# Patient Record
Sex: FEMALE | Race: WHITE | NOT HISPANIC OR LATINO | Employment: FULL TIME | URBAN - METROPOLITAN AREA
[De-identification: names, ages, dates, MRNs, and addresses within clinical notes are randomized per-mention and may not be internally consistent; named-entity substitution may affect disease eponyms.]

---

## 2024-02-12 ENCOUNTER — TELEMEDICINE (OUTPATIENT)
Dept: PRIMARY CARE | Facility: CLINIC | Age: 39
End: 2024-02-12
Payer: COMMERCIAL

## 2024-02-12 DIAGNOSIS — H66.92: ICD-10-CM

## 2024-02-12 DIAGNOSIS — H92.02 OTALGIA OF LEFT EAR: ICD-10-CM

## 2024-02-12 DIAGNOSIS — H66.002 NON-RECURRENT ACUTE SUPPURATIVE OTITIS MEDIA OF LEFT EAR WITHOUT SPONTANEOUS RUPTURE OF TYMPANIC MEMBRANE: Primary | ICD-10-CM

## 2024-02-12 PROCEDURE — 99442 PR PHYS/QHP TELEPHONE EVALUATION 11-20 MIN: CPT | Performed by: NURSE PRACTITIONER

## 2024-02-12 RX ORDER — AMOXICILLIN AND CLAVULANATE POTASSIUM 875; 125 MG/1; MG/1
875 TABLET, FILM COATED ORAL 2 TIMES DAILY
Qty: 20 TABLET | Refills: 0 | Status: SHIPPED | OUTPATIENT
Start: 2024-02-12 | End: 2024-02-22

## 2024-02-12 NOTE — PROGRESS NOTES
Subjective   Patient ID: Fiordaliza Julio is a 38 y.o. female who is with chief complaint of left ear pain.    HPI  Patient is a 38 y.o. female who CONSULTED AT [facility] - PHONE ONLY today. Patient is with complaints of left ear pain preceded by nasal congestion, nasal discharge, throat irritation, and  cough. Patient states that present condition started about 1 week ago as nasal congestion, watery nasal discharge, throat irritation and cough which was accompanied 1 day later by pain on left ear. She states these symptoms were not accompanied by fever, chills, nor any ear discharge. She denies shortness of breath, chest pain, palpitations, nor edema. She stated that she tried OTC cough and cold medications which afforded relief of symptoms of runny nose, cough, and sore throat but the ear pain is still there. She denies nausea, vomiting, abdominal pain, nor any other symptoms.     Review of Systems  General: no weight loss, generally healthy, no fatigue  Head:  no headaches / sinus pain, no vertigo, no injury  Eyes: no diplopia, no tearing, no pain,   Ears: (+) left ear pain, no tinnitus, no bleeding, no vertigo  Mouth:  no dental difficulties, no gingival bleeding, (+) throat irritation, no loss of sense of taste  Nose: (+) hx congestion, (+) hx discharge, no bleeding, no obstruction, no loss of sense of smell  Neck: no stiffness, no pain, no tenderness, no masses, no bruit  Pulmonary: no dyspnea, no wheezing, no hemoptysis, (+) hx cough  Cardiovascular: no chest pain, no palpitations, no syncope, no orthopnea  Gastrointestinal: no change in appetite, no dysphagia, no abdominal pains, no diarrhea, no emesis, no melena  Genito Urinary: no dysuria, no urinary urgency, no nocturia, no incontinence, no change in nature of urine  Musculoskeletal: no muscle ache, no joint pain, no limitation of range of motion, no paresthesia, no numbness  Constitutional: no fever, no chills, no night  sweats    Objective   NO VITAL SIGNS TAKEN FOR THIS PATIENT (VIRTUAL VISIT CONSULT - PHONE ONLY)    Physical Exam  PHYSICAL EXAMINATION WAS NOT DONE FOR THIS PATIENT (VIRTUAL VISIT CONSULT - PHONE ONLY)    Assessment/Plan   Problem List Items Addressed This Visit    None  Visit Diagnoses         Codes    Non-recurrent acute suppurative otitis media of left ear without spontaneous rupture of tympanic membrane    -  Primary H66.002    Relevant Medications    amoxicillin-pot clavulanate (Augmentin) 875-125 mg tablet    Otalgia of left ear     H92.02    Relevant Medications    amoxicillin-pot clavulanate (Augmentin) 875-125 mg tablet    Infection of left ear     H66.92    Relevant Medications    amoxicillin-pot clavulanate (Augmentin) 875-125 mg tablet        DISCHARGE SUMMARY:   Diagnosis, treatment, treatment options, and possible complications of today's illness discussed and explained to patient. Patient to take medication/s associated with this visit. Patient may also take OTC analgesic/antipyretic if needed for pain/fever. Advised to avoid ear manipulation / cleaning. Advised to avoid submerging on water. Advised to increase oral fluid intake. Advised to come back if with worsening or persistent symptoms. Patient verbalized understanding of plan of care.    Patient to come back in 7 - 10 days if needed for worsening symptoms.           TRIP Olsen-CNP 02/12/24 10:10 AM

## 2024-02-13 NOTE — PATIENT INSTRUCTIONS
DISCHARGE SUMMARY:   Diagnosis, treatment, treatment options, and possible complications of today's illness discussed and explained to patient. Patient to take medication/s associated with this visit. Patient may also take OTC analgesic/antipyretic if needed for pain/fever. Advised to avoid ear manipulation / cleaning. Advised to avoid submerging on water. Advised to increase oral fluid intake. Advised to come back if with worsening or persistent symptoms. Patient verbalized understanding of plan of care.    Patient to come back in 7 - 10 days if needed for worsening symptoms.

## 2024-02-19 ENCOUNTER — DOCUMENTATION (OUTPATIENT)
Dept: PRIMARY CARE | Facility: CLINIC | Age: 39
End: 2024-02-19
Payer: COMMERCIAL

## 2024-02-19 DIAGNOSIS — H66.002 NON-RECURRENT ACUTE SUPPURATIVE OTITIS MEDIA OF LEFT EAR WITHOUT SPONTANEOUS RUPTURE OF TYMPANIC MEMBRANE: Primary | ICD-10-CM

## 2024-02-19 DIAGNOSIS — H92.02 OTALGIA OF LEFT EAR: ICD-10-CM

## 2024-02-19 DIAGNOSIS — H66.92: ICD-10-CM

## 2024-02-19 RX ORDER — CIPROFLOXACIN 500 MG/1
500 TABLET ORAL 2 TIMES DAILY
Qty: 20 TABLET | Refills: 0 | Status: SHIPPED | OUTPATIENT
Start: 2024-02-19 | End: 2024-02-29

## 2024-02-20 NOTE — PROGRESS NOTES
Patient ID: Fiordaliza Julio is a 38 y.o. female who presents for the evaluation of pain and hearing loss in the left ear.    PROVIDER IMPRESSIONS:  DIAGNOSES/PROBLEMS:  -Conductive hearing loss in left ear with unrestricted hearing in right ear  -Left cerumen impaction (cast)    ASSESSMENT:   Fiordaliza Julio is a pleasant 38 y.o. female who presents with symptoms of left hearing loss and left otalgia. Based on the clinical information provided, symptoms and clinical exam findings are consistent with left cerumen impaction with cerumen cast deep in the left EAC. Unfortunately, due to degree of adherence and hard/firm texture of cerumen, attempt to remove left cerumen impaction today was unsuccessful and was unable to visualize left TM. I explained to patient that optimally safe removal of cerumen cast will include use of ceruminolytic to soften cerumen over the course of the week. Reassurance provided to patient that otologic exam today revealed no evidence of acute infection/inflammation in the external auditory canal (EAC) bilaterally and that right tympanic membrane (TM) appears with no evidence of infection, effusion, retraction or perforation bilaterally.  Audiogram reviewed in detail with the patient, which revealed mild conductive hearing loss in the left ear with type B tympanogram likely due to completely occlusive left cerumen cast.     PLAN:  I recommended use of o.t.c. ceruminolytic drops in the left ear every evening until follow-up to soften cerumen casted over the left TM. Patient verbalized understanding.   I recommended ongoing use of Flonase nasal spray with 1 puff in each nostril twice daily and p.o. ciprofloxacin managed by other clinicians.   Follow-up: Patient may schedule for follow-up in 1 week for second attempt at removal of cerumen cast in the left EAC. They may follow-up sooner, if needed. May consider repeat audiogram evaluation at follow-up. Patient is agreeable to this plan, all questions  were answered to patient's satisfaction.     Subjective   HPI: Fiordaliza Julio is a 38 y.o. female who presents for the evaluation of hearing loss and pain in the left ear.  The patient states that symptom onset began approximately 2-3 weeks ago. States she tested positive for influenza virus on 2/9/24, few days later noticed left ear pain and clogged/plugged sensation. Describes ear pain as a pressure and dull aching sensation rates as a 2/10 but while sneezing or coughing can be a brief sharp/shooting pain that is severe. Describes hearing loss as a muffled sensation. Patient virtually seen by PCP on 2/12/24 for 1 week symptom duration of left otalgia preceded by symptoms of nasal congestion, rhinorrhea, throat irritation, and cough and treated with p.o. Augmentin x 10 days for suspected left ear infection. Seen again by PCP on 2/19/24 for ongoing left-sided otalgia and plugged ear sensation and treated with p.o. ciprofloxacin BID x 10 days for left AOM, which she is still taking. Received an IM shot of rocephin this morning. When asked about the presence of hearing loss, ear pain, ear fullness/pressure, tinnitus, ear itching, ear drainage, autophony, dizziness or vertigo, she admits to mild occasional dizziness/dysequillbrium and left tinnitus. When asked about pertinent otologic history, the patient denies a history of recurrent ear infections, denies history of ear surgery, denies history of PE tube insertion, and denies history of prolonged/traumatic loud noise exposure. The patient does not insert Q-tips in the ear canals, and  denies insertion of other foreign objects into the ear canals. The patient denies history of wearing hearing aid devices. The patient does not endorse a family history of hearing loss.     PATIENT HISTORY:  Past Medical History:   Diagnosis Date    Personal history of other diseases of the respiratory system     History of asthma      Past Surgical History:   Procedure Laterality Date     OTHER SURGICAL HISTORY  02/13/2020    [place] tooth extraction    OTHER SURGICAL HISTORY  02/13/2020    Tonsillectomy with adenoidectomy      No Known Allergies     Current Outpatient Medications:     amoxicillin-pot clavulanate (Augmentin) 875-125 mg tablet, Take 1 tablet (875 mg) by mouth 2 times a day for 10 days., Disp: 20 tablet, Rfl: 0    ciprofloxacin (Cipro) 500 mg tablet, Take 1 tablet (500 mg) by mouth 2 times a day for 10 days., Disp: 20 tablet, Rfl: 0   Tobacco Use: Not on file      Alcohol Use: Not on file      Social History     Substance and Sexual Activity   Drug Use Not on file        Review of Systems   All other systems negative.     Objective   There were no vitals taken for this visit.     PHYSICAL EXAM:  General appearance: Appears well, well-nourished, well groomed. No acute distress.   Constitutional: No fever, chills, weight loss or weight gain.  Communication: Normal communication  Psychiatric: Oriented to person, place and time. Normal mood and affect.  Neurologic: Cranial nerves II-XII grossly intact and symmetric bilaterally.  Cardiovascular: Examination of peripheral vascular system shows no clubbing or cyanosis.  Respiratory: No respiratory distress increased work of breathing. Inspection of the chest with symmetric chest expansion and normal respiratory effort.  Skin: No head and neck rashes.  Head: Normocephalic. Atraumatic with no masses, lesions or scarring.  Face: Normal symmetry. No scars or deformities.  Eyes: Conjunctiva not edematous or erythematous. PERRLA  Neck: Supple and symmetric, trachea midline. Lymph nodes with no adenopathy.  Head: Normocephalic. Atraumatic with no masses, lesions or scarring.  Eyes: PERRL, EOMI, Conjunctiva is clear. No nystagmus.  Nose: External inspection of nose: No nasal lesions, lacerations or scars.   Throat:  Floor of mouth is clear, no masses.  Tongue appears normal, no lesions or masses. Gums, gingiva, buccal mucosa appear pink and moist,  no lesions. Teeth are in intact.  No obvious dental infections.  Peritonsillar regions appear symmetric without swelling.  Hard and soft palate appear normal, no obvious cleft. Uvula is midline.  Oropharynx: No lesions. Retropharyngeal wall is flat.  No postnasal drip.  Salivary Glands: Symmetric bilaterally.  No palpable masses.  No evidence of acute infection or salivary stones.  Right Ear: External inspection of ear with no deformity, scars, or masses. Mastoid is nontender.   External auditory canal is clear. TM is intact with no sign of infection, effusion, or retraction.  No perforation seen.   Left Ear: External inspection of ear with no deformity, scars, or masses. Mastoid is nontender.   External auditory canal is completely impacted with a thin/hard cerumen cast deep in the EAC. Unable to visualize TM.       RESULTS:  I personally reviewed the patient's audiogram today from 2/22/24, which showed: Normal hearing across all frequencies in bilateral ears. Normal tympanogram in the right ear and type B tympanogram in the left ear. Excellent word discrimination bilaterally. Acoustic reflexes present right ipsilateral, and absent left ipsilateral.    Procedures   EAR CLEANING PROCEDURE NOTE:  Indication: Cerumen impaction  Location: left ear canals  Procedure Note: The procedure was performed by the provider.  Visualization Instrument: A microscope with a #5 speculum was placed in the ear canals to visualize the ear canal debris.  Ear Cleaning Instrument and Outcome: Using the alligator forceps and suction with 3 drops of normal saline instillation for wax softening, left EAC cerumen cast removal was attempted.   Due to the degree of impaction with firm/hard cerumen cast and patient discomfort, left EAC cerumen cast was unable to be removed today. Unable to visualize left TM.   Patient Status: The patient tolerated the procedure well.  Complications: There were no complications.     Nereyda Small, APRN-CNP

## 2024-02-22 ENCOUNTER — CLINICAL SUPPORT (OUTPATIENT)
Dept: PRIMARY CARE | Facility: CLINIC | Age: 39
End: 2024-02-22
Payer: COMMERCIAL

## 2024-02-22 ENCOUNTER — CLINICAL SUPPORT (OUTPATIENT)
Dept: AUDIOLOGY | Facility: CLINIC | Age: 39
End: 2024-02-22
Payer: COMMERCIAL

## 2024-02-22 ENCOUNTER — OFFICE VISIT (OUTPATIENT)
Dept: OTOLARYNGOLOGY | Facility: CLINIC | Age: 39
End: 2024-02-22
Payer: COMMERCIAL

## 2024-02-22 VITALS — TEMPERATURE: 97.8 F | DIASTOLIC BLOOD PRESSURE: 73 MMHG | SYSTOLIC BLOOD PRESSURE: 121 MMHG

## 2024-02-22 DIAGNOSIS — H92.02 OTALGIA OF LEFT EAR: ICD-10-CM

## 2024-02-22 DIAGNOSIS — H90.12 CONDUCTIVE HEARING LOSS OF LEFT EAR WITH UNRESTRICTED HEARING OF RIGHT EAR: Primary | ICD-10-CM

## 2024-02-22 DIAGNOSIS — H69.92 DYSFUNCTION OF LEFT EUSTACHIAN TUBE: Primary | ICD-10-CM

## 2024-02-22 DIAGNOSIS — H61.22 IMPACTED CERUMEN OF LEFT EAR: ICD-10-CM

## 2024-02-22 DIAGNOSIS — H90.12 CONDUCTIVE HEARING LOSS OF LEFT EAR WITH UNRESTRICTED HEARING OF RIGHT EAR: ICD-10-CM

## 2024-02-22 DIAGNOSIS — H66.92: ICD-10-CM

## 2024-02-22 PROCEDURE — 92550 TYMPANOMETRY & REFLEX THRESH: CPT | Performed by: AUDIOLOGIST

## 2024-02-22 PROCEDURE — 1036F TOBACCO NON-USER: CPT

## 2024-02-22 PROCEDURE — 92557 COMPREHENSIVE HEARING TEST: CPT | Performed by: AUDIOLOGIST

## 2024-02-22 PROCEDURE — 96372 THER/PROPH/DIAG INJ SC/IM: CPT | Performed by: FAMILY MEDICINE

## 2024-02-22 PROCEDURE — 99203 OFFICE O/P NEW LOW 30 MIN: CPT

## 2024-02-22 RX ORDER — CEFTRIAXONE 1 G/1
1 INJECTION, POWDER, FOR SOLUTION INTRAMUSCULAR; INTRAVENOUS ONCE
Status: COMPLETED | OUTPATIENT
Start: 2024-02-22 | End: 2024-02-22

## 2024-02-22 RX ORDER — FLUTICASONE PROPIONATE 50 MCG
SPRAY, SUSPENSION (ML) NASAL
COMMUNITY
Start: 2022-12-02

## 2024-02-22 RX ADMIN — CEFTRIAXONE 1 G: 1 INJECTION, POWDER, FOR SOLUTION INTRAMUSCULAR; INTRAVENOUS at 10:30

## 2024-02-22 ASSESSMENT — PAIN SCALES - GENERAL: PAINLEVEL_OUTOF10: 2

## 2024-02-22 ASSESSMENT — PATIENT HEALTH QUESTIONNAIRE - PHQ9
2. FEELING DOWN, DEPRESSED OR HOPELESS: NOT AT ALL
SUM OF ALL RESPONSES TO PHQ9 QUESTIONS 1 AND 2: 0
1. LITTLE INTEREST OR PLEASURE IN DOING THINGS: NOT AT ALL

## 2024-02-22 ASSESSMENT — ENCOUNTER SYMPTOMS: OCCASIONAL FEELINGS OF UNSTEADINESS: 0

## 2024-02-22 ASSESSMENT — PAIN - FUNCTIONAL ASSESSMENT: PAIN_FUNCTIONAL_ASSESSMENT: 0-10

## 2024-02-22 NOTE — PROGRESS NOTES
AUDIOLOGY ADULT AUDIOMETRIC EVALUATION      Name:  Fiordaliza Julio  :  1985  Age:  38 y.o.  Date of Evaluation: 24    History:  Reason for visit:  Ms. Fiordaliza Julio was seen today as part of the visit with AWILDA Crowell for an evaluation of hearing.   Chief Complaint   Patient presents with    Ear Pressure    Ear Fullness    Earache    Hearing Problem     The patient reported she experienced the flu near the beginning of February and has continued to notice auditory symptoms on the left side. Reported a left otalgia, rated as a 2/10 on the pain scale, and occurring deep inside and around the ear. Stated at times she may notice a sharp pain, reported as a 7/10 on the pain scale.   She has noticed muffled hearing in the left ear as well as a sensation of ear fullness and pressure.   Stated she has completed various antibiotics and medications without relief.   She has noticed intermittent sensations of tinnitus and dizziness.   Denied any recent falls, significant noise exposure, sinus/throat concerns, ear drainage, or sudden hearing loss.    EVALUATION     See Audiogram    RESULTS:    Otoscopic Evaluation:   Right Ear: Otoscopy revealed a clear healthy canal and a healthy tympanic membrane was visualized.   Left Ear: Otoscopy revealed a clear healthy canal and a healthy tympanic membrane was visualized.     Immittance:  Immittance Measures: 226 Hz   Right Ear: Tympanometric testing revealed a normal type A tympanogram with normal middle ear pressure and normal static compliance.  Left Ear: Tympanometric testing revealed a type B flat tympanogram with no measurable middle ear pressure or static compliance and normal ear canal volume. Results may be consistent with middle ear effusion.    Right Ear: Ipsilateral acoustic reflexes were present at, 500-4,000 Hz, at expected sensation levels.  Left Ear: Ipsilateral acoustic reflexes were absent at, 500-4,000 Hz. Results are consistent with the  test results.     Test technique:  Pure Tone Audiometry via insert earphones    Reliability:   excellent    Pure Tone Audiometry:    Right Ear: Audiometric testing indicated normal peripheral hearing sensitivity from 125-8,000 Hz.   Left Ear:   Audiometric testing indicated a mild conductive hearing loss through 1,000 Hz, rising to normal peripheral hearing sensitivity through 3,000 Hz, sloping to a mild conductive hearing loss above.       Speech Audiometry:   Right Ear:  Speech Reception Threshold (SRT) was obtained at 10 dBHL                  Word Recognition scores were excellent (100%) in quiet when words were presented at 50 dBHL  Left Ear:  Speech Reception Threshold (SRT) was obtained at  20 dBHL                  Word Recognition scores were excellent (100%) in quiet when words were presented at 60 dBHL  Testing was performed with recorded NU-6 speech words in quiet. Speech thresholds were in good agreement with the pure tone averages in each ear.     IMPRESSIONS:  Today's test results are hearing loss requiring medical/otologic and audiologic follow-up.  The patient was counseled with regard to the findings.    RECOMMENDATIONS:  * Continue medical follow up with AWILDA Crowell.  * Retest as medically indicated, or sooner if a change in hearing sensitivity is noticed.   * Wear hearing protection while in the presence of loud sounds.   * Use effective communication strategies such as asking the speaker to gain attention prior to speaking, speaking in the same room, repeating words that were heard, etc.    PATIENT EDUCATION:   Discussed results and recommendations with the patient.  Questions were addressed and the patient was encouraged to contact our department should concerns arise.  The patient was seen from  1:00-1:30 pm.

## 2024-02-28 ENCOUNTER — APPOINTMENT (OUTPATIENT)
Dept: OTOLARYNGOLOGY | Facility: CLINIC | Age: 39
End: 2024-02-28
Payer: COMMERCIAL

## 2024-02-28 ENCOUNTER — CLINICAL SUPPORT (OUTPATIENT)
Dept: PRIMARY CARE | Facility: CLINIC | Age: 39
End: 2024-02-28
Payer: COMMERCIAL

## 2024-02-28 DIAGNOSIS — H65.90: ICD-10-CM

## 2024-02-28 PROCEDURE — 96372 THER/PROPH/DIAG INJ SC/IM: CPT | Performed by: FAMILY MEDICINE

## 2024-02-28 RX ORDER — CEFTRIAXONE 1 G/1
1 INJECTION, POWDER, FOR SOLUTION INTRAMUSCULAR; INTRAVENOUS ONCE
Status: COMPLETED | OUTPATIENT
Start: 2024-02-28 | End: 2024-02-28

## 2024-02-28 RX ADMIN — CEFTRIAXONE 1 G: 1 INJECTION, POWDER, FOR SOLUTION INTRAMUSCULAR; INTRAVENOUS at 11:46

## 2024-05-17 DIAGNOSIS — H10.9 BACTERIAL CONJUNCTIVITIS: Primary | ICD-10-CM

## 2024-05-17 DIAGNOSIS — H10.9 BACTERIAL CONJUNCTIVITIS: ICD-10-CM

## 2024-05-17 RX ORDER — TOBRAMYCIN 3 MG/ML
2 SOLUTION/ DROPS OPHTHALMIC EVERY 4 HOURS
Qty: 5 ML | Refills: 0 | Status: SHIPPED | OUTPATIENT
Start: 2024-05-17 | End: 2024-05-20

## 2024-05-17 NOTE — TELEPHONE ENCOUNTER
Mariza from [facility] pharmacy call in stated the       tobramycin (Tobrex) 0.3 % ophthalmic solution        Sig: Administer 2 drops into both eyes every 4 hours for 14 days.          That one bottle is for only 3 days.    Please assist

## 2024-05-20 RX ORDER — TOBRAMYCIN 3 MG/ML
SOLUTION/ DROPS OPHTHALMIC
Qty: 5 ML | Refills: 0 | Status: SHIPPED | OUTPATIENT
Start: 2024-05-20 | End: 2024-05-23 | Stop reason: SDUPTHER

## 2024-05-23 RX ORDER — TOBRAMYCIN 3 MG/ML
2 SOLUTION/ DROPS OPHTHALMIC EVERY 4 HOURS
Qty: 20 ML | Refills: 0 | Status: SHIPPED | OUTPATIENT
Start: 2024-05-23 | End: 2024-06-06

## 2024-09-12 ENCOUNTER — OFFICE VISIT (OUTPATIENT)
Dept: PRIMARY CARE | Facility: CLINIC | Age: 39
End: 2024-09-12
Payer: COMMERCIAL

## 2024-09-12 VITALS
DIASTOLIC BLOOD PRESSURE: 70 MMHG | RESPIRATION RATE: 20 BRPM | WEIGHT: 145.8 LBS | BODY MASS INDEX: 22.5 KG/M2 | OXYGEN SATURATION: 98 % | HEART RATE: 91 BPM | SYSTOLIC BLOOD PRESSURE: 112 MMHG | TEMPERATURE: 97.3 F

## 2024-09-12 DIAGNOSIS — Z83.2 FAMILY HISTORY OF FACTOR V DEFICIENCY: ICD-10-CM

## 2024-09-12 DIAGNOSIS — E53.8 VITAMIN B12 DEFICIENCY: ICD-10-CM

## 2024-09-12 DIAGNOSIS — R53.83 OTHER FATIGUE: ICD-10-CM

## 2024-09-12 DIAGNOSIS — E55.9 VITAMIN D DEFICIENCY: ICD-10-CM

## 2024-09-12 DIAGNOSIS — M25.50 MULTIPLE JOINT PAIN: Primary | ICD-10-CM

## 2024-09-12 DIAGNOSIS — Z13.220 LIPID SCREENING: ICD-10-CM

## 2024-09-12 PROBLEM — J30.9 ALLERGIC RHINITIS: Status: ACTIVE | Noted: 2024-09-12

## 2024-09-12 PROBLEM — N97.9 FEMALE INFERTILITY, SECONDARY: Status: ACTIVE | Noted: 2024-09-12

## 2024-09-12 PROCEDURE — 99214 OFFICE O/P EST MOD 30 MIN: CPT | Performed by: NURSE PRACTITIONER

## 2024-09-12 NOTE — PROGRESS NOTES
Subjective   Patient ID: Fiordaliza Julio is a 39 y.o. female who presents for Joint Pain (2 months/Moves to different joints, feels like inflammation/Swells-today is left knee, yesterday right knee, a few days ago right hip, seems to be larger joints, not in hands/fingers/feet/toes.). Motrin helps a little.  Ice helps, but all remedies are temporary.    Started with back pain, felt like she was getting covid, lasted for a few days and moved to right hip. Hip was swollen and had pain with weight bearing. She took ibuprofen with some improvement, then moved to left knee, with swellin. Her left shoulder did the same thing for 1 week, sometimes lasts 4-7 days.    She hasn't had any rashes, fevers, weight loss. She feels the best when she wakes up in the morning.    HPI     Review of Systems   Constitutional:  Negative for chills, fatigue and fever.   HENT:  Negative for congestion, ear pain, rhinorrhea, sinus pressure and sore throat.    Eyes:  Negative for pain, discharge and itching.   Respiratory:  Negative for cough, shortness of breath and wheezing.    Cardiovascular:  Negative for chest pain and palpitations.   Gastrointestinal:  Negative for constipation, diarrhea, nausea and vomiting.   Genitourinary:  Negative for difficulty urinating and dysuria.   Musculoskeletal:  Positive for arthralgias, joint swelling and myalgias. Negative for back pain.   Skin:  Negative for color change.   Neurological:  Negative for headaches.   Hematological:  Negative for adenopathy.   Psychiatric/Behavioral:  Negative for decreased concentration. The patient is not nervous/anxious.        Objective   /70   Pulse 91   Temp 36.3 °C (97.3 °F)   Resp 20   Wt 66.1 kg (145 lb 12.8 oz)   SpO2 98%   BMI 22.50 kg/m²     Physical Exam  Constitutional:       Appearance: Normal appearance.   Cardiovascular:      Rate and Rhythm: Normal rate and regular rhythm.      Pulses: Normal pulses.      Heart sounds: Normal heart sounds.    Pulmonary:      Effort: Pulmonary effort is normal.      Breath sounds: Normal breath sounds.   Abdominal:      General: Bowel sounds are normal.      Palpations: Abdomen is soft.   Musculoskeletal:         General: No swelling or tenderness. Normal range of motion.      Right lower leg: No edema.      Left lower leg: No edema.   Skin:     General: Skin is warm and dry.   Neurological:      Mental Status: She is alert.   Psychiatric:         Mood and Affect: Mood normal.         Behavior: Behavior normal.         Assessment/Plan   Problem List Items Addressed This Visit       Vitamin D deficiency    Relevant Orders    Vitamin D 25-Hydroxy,Total (for eval of Vitamin D levels) (Completed)    Fatigue    Relevant Orders    CBC and Auto Differential (Completed)    Comprehensive Metabolic Panel (Completed)    TSH with reflex to Free T4 if abnormal (Completed)     Other Visit Diagnoses       Multiple joint pain    -  Primary    Relevant Orders    NIRANJAN with Reflex to CATHI    Citrulline Antibody, IgG (Completed)    C-Reactive Protein (Completed)    Sedimentation Rate (Completed)    Rheumatoid Factor (Completed)    Uric Acid (Completed)    Ramses-Barr Virus Antibody Panel (Completed)    Lyme Disease (Borrelia burgdorferi), PCR    Family history of factor V deficiency        Relevant Orders    Factor V Leiden    Lipid screening        Relevant Orders    Lipid Panel (Completed)    Vitamin B12 deficiency        Relevant Orders    Vitamin B12 (Completed)          Patient Instructions   Patient to have fasting labs drawn, and we will call with results when available. Discussed referral to orthopedics/rheumatology pending results. Follow-up in 1-2 months, or sooner if needed. Call the office if any problems or concerns in the meantime.

## 2024-09-13 ENCOUNTER — LAB (OUTPATIENT)
Dept: LAB | Facility: LAB | Age: 39
End: 2024-09-13
Payer: COMMERCIAL

## 2024-09-13 DIAGNOSIS — Z13.220 LIPID SCREENING: ICD-10-CM

## 2024-09-13 DIAGNOSIS — R53.83 OTHER FATIGUE: ICD-10-CM

## 2024-09-13 DIAGNOSIS — E55.9 VITAMIN D DEFICIENCY: ICD-10-CM

## 2024-09-13 DIAGNOSIS — Z83.2 FAMILY HISTORY OF FACTOR V DEFICIENCY: ICD-10-CM

## 2024-09-13 DIAGNOSIS — M25.50 MULTIPLE JOINT PAIN: ICD-10-CM

## 2024-09-13 DIAGNOSIS — E53.8 VITAMIN B12 DEFICIENCY: ICD-10-CM

## 2024-09-13 LAB
25(OH)D3 SERPL-MCNC: 30 NG/ML (ref 30–100)
ALBUMIN SERPL BCP-MCNC: 4.4 G/DL (ref 3.4–5)
ALP SERPL-CCNC: 27 U/L (ref 33–110)
ALT SERPL W P-5'-P-CCNC: 12 U/L (ref 7–45)
ANION GAP SERPL CALC-SCNC: 10 MMOL/L (ref 10–20)
AST SERPL W P-5'-P-CCNC: 14 U/L (ref 9–39)
BASOPHILS # BLD AUTO: 0.03 X10*3/UL (ref 0–0.1)
BASOPHILS NFR BLD AUTO: 0.6 %
BILIRUB SERPL-MCNC: 0.6 MG/DL (ref 0–1.2)
BUN SERPL-MCNC: 9 MG/DL (ref 6–23)
CALCIUM SERPL-MCNC: 9.4 MG/DL (ref 8.6–10.3)
CCP IGG SERPL-ACNC: <1 U/ML
CHLORIDE SERPL-SCNC: 107 MMOL/L (ref 98–107)
CHOLEST SERPL-MCNC: 184 MG/DL (ref 0–199)
CHOLESTEROL/HDL RATIO: 2.6
CO2 SERPL-SCNC: 26 MMOL/L (ref 21–32)
CREAT SERPL-MCNC: 0.74 MG/DL (ref 0.5–1.05)
CRP SERPL-MCNC: 0.12 MG/DL
EBV EA IGG SER QL: NEGATIVE
EBV NA AB SER QL: POSITIVE
EBV VCA IGG SER IA-ACNC: POSITIVE
EBV VCA IGM SER IA-ACNC: POSITIVE
EGFRCR SERPLBLD CKD-EPI 2021: >90 ML/MIN/1.73M*2
EOSINOPHIL # BLD AUTO: 0.16 X10*3/UL (ref 0–0.7)
EOSINOPHIL NFR BLD AUTO: 3.5 %
ERYTHROCYTE [DISTWIDTH] IN BLOOD BY AUTOMATED COUNT: 13.1 % (ref 11.5–14.5)
ERYTHROCYTE [SEDIMENTATION RATE] IN BLOOD BY WESTERGREN METHOD: 4 MM/H (ref 0–20)
GLUCOSE SERPL-MCNC: 96 MG/DL (ref 74–99)
HCT VFR BLD AUTO: 39.5 % (ref 36–46)
HDLC SERPL-MCNC: 69.5 MG/DL
HGB BLD-MCNC: 12.5 G/DL (ref 12–16)
IMM GRANULOCYTES # BLD AUTO: 0.01 X10*3/UL (ref 0–0.7)
IMM GRANULOCYTES NFR BLD AUTO: 0.2 % (ref 0–0.9)
LDLC SERPL CALC-MCNC: 100 MG/DL
LYMPHOCYTES # BLD AUTO: 1.46 X10*3/UL (ref 1.2–4.8)
LYMPHOCYTES NFR BLD AUTO: 31.5 %
MCH RBC QN AUTO: 28.9 PG (ref 26–34)
MCHC RBC AUTO-ENTMCNC: 31.6 G/DL (ref 32–36)
MCV RBC AUTO: 91 FL (ref 80–100)
MONOCYTES # BLD AUTO: 0.45 X10*3/UL (ref 0.1–1)
MONOCYTES NFR BLD AUTO: 9.7 %
NEUTROPHILS # BLD AUTO: 2.52 X10*3/UL (ref 1.2–7.7)
NEUTROPHILS NFR BLD AUTO: 54.5 %
NON HDL CHOLESTEROL: 115 MG/DL (ref 0–149)
NRBC BLD-RTO: 0 /100 WBCS (ref 0–0)
PLATELET # BLD AUTO: 226 X10*3/UL (ref 150–450)
POTASSIUM SERPL-SCNC: 4.9 MMOL/L (ref 3.5–5.3)
PROT SERPL-MCNC: 6.6 G/DL (ref 6.4–8.2)
RBC # BLD AUTO: 4.32 X10*6/UL (ref 4–5.2)
RHEUMATOID FACT SER NEPH-ACNC: <10 IU/ML (ref 0–15)
SODIUM SERPL-SCNC: 138 MMOL/L (ref 136–145)
TRIGL SERPL-MCNC: 73 MG/DL (ref 0–149)
TSH SERPL-ACNC: 1.73 MIU/L (ref 0.44–3.98)
URATE SERPL-MCNC: 3.5 MG/DL (ref 2.3–6.7)
VIT B12 SERPL-MCNC: 227 PG/ML (ref 211–911)
VLDL: 15 MG/DL (ref 0–40)
WBC # BLD AUTO: 4.6 X10*3/UL (ref 4.4–11.3)

## 2024-09-13 PROCEDURE — 85652 RBC SED RATE AUTOMATED: CPT

## 2024-09-13 PROCEDURE — 86431 RHEUMATOID FACTOR QUANT: CPT

## 2024-09-13 PROCEDURE — 82306 VITAMIN D 25 HYDROXY: CPT

## 2024-09-13 PROCEDURE — 86664 EPSTEIN-BARR NUCLEAR ANTIGEN: CPT

## 2024-09-13 PROCEDURE — 81241 F5 GENE: CPT

## 2024-09-13 PROCEDURE — 86038 ANTINUCLEAR ANTIBODIES: CPT

## 2024-09-13 PROCEDURE — 86665 EPSTEIN-BARR CAPSID VCA: CPT

## 2024-09-13 PROCEDURE — 36415 COLL VENOUS BLD VENIPUNCTURE: CPT

## 2024-09-13 PROCEDURE — 86200 CCP ANTIBODY: CPT

## 2024-09-13 PROCEDURE — 80061 LIPID PANEL: CPT

## 2024-09-13 PROCEDURE — 86225 DNA ANTIBODY NATIVE: CPT

## 2024-09-13 PROCEDURE — 84443 ASSAY THYROID STIM HORMONE: CPT

## 2024-09-13 PROCEDURE — 84550 ASSAY OF BLOOD/URIC ACID: CPT

## 2024-09-13 PROCEDURE — 86235 NUCLEAR ANTIGEN ANTIBODY: CPT

## 2024-09-13 PROCEDURE — 86140 C-REACTIVE PROTEIN: CPT

## 2024-09-13 PROCEDURE — 87476 LYME DIS DNA AMP PROBE: CPT

## 2024-09-13 PROCEDURE — 80053 COMPREHEN METABOLIC PANEL: CPT

## 2024-09-13 PROCEDURE — 82607 VITAMIN B-12: CPT

## 2024-09-13 PROCEDURE — 85025 COMPLETE CBC W/AUTO DIFF WBC: CPT

## 2024-09-13 PROCEDURE — 86663 EPSTEIN-BARR ANTIBODY: CPT

## 2024-09-13 ASSESSMENT — ENCOUNTER SYMPTOMS
VOMITING: 0
DECREASED CONCENTRATION: 0
RHINORRHEA: 0
WHEEZING: 0
FATIGUE: 0
SHORTNESS OF BREATH: 0
CHILLS: 0
DYSURIA: 0
FEVER: 0
MYALGIAS: 1
ARTHRALGIAS: 1
NAUSEA: 0
BACK PAIN: 0
EYE ITCHING: 0
HEADACHES: 0
COLOR CHANGE: 0
ADENOPATHY: 0
SORE THROAT: 0
SINUS PRESSURE: 0
CONSTIPATION: 0
JOINT SWELLING: 1
NERVOUS/ANXIOUS: 0
PALPITATIONS: 0
EYE PAIN: 0
COUGH: 0
DIARRHEA: 0
DIFFICULTY URINATING: 0
EYE DISCHARGE: 0

## 2024-09-13 NOTE — PATIENT INSTRUCTIONS
Patient to have fasting labs drawn, and we will call with results when available. Discussed referral to orthopedics/rheumatology pending results. Follow-up in 1-2 months, or sooner if needed. Call the office if any problems or concerns in the meantime.

## 2024-09-16 LAB
ANA PATTERN: ABNORMAL
ANA SER QL HEP2 SUBST: POSITIVE
ANA TITR SER IF: ABNORMAL {TITER}
B BURGDOR DNA SPEC QL NAA+PROBE: NOT DETECTED
CENTROMERE B AB SER-ACNC: <0.2 AI
CHROMATIN AB SERPL-ACNC: <0.2 AI
DSDNA AB SER-ACNC: 2 IU/ML
ENA JO1 AB SER QL IA: <0.2 AI
ENA RNP AB SER IA-ACNC: 0.3 AI
ENA SCL70 AB SER QL IA: <0.2 AI
ENA SM AB SER IA-ACNC: <0.2 AI
ENA SM+RNP AB SER QL IA: <0.2 AI
ENA SS-A AB SER IA-ACNC: <0.2 AI
ENA SS-B AB SER IA-ACNC: <0.2 AI
RIBOSOMAL P AB SER-ACNC: <0.2 AI
SPECIMEN SOURCE: NORMAL

## 2024-09-19 DIAGNOSIS — R76.8 POSITIVE ANA (ANTINUCLEAR ANTIBODY): Primary | ICD-10-CM

## 2024-09-23 LAB
ELECTRONICALLY SIGNED BY: NORMAL
FACTOR V LEIDEN INTERPRETATION: NORMAL
FACTOR V LEIDEN RESULT: NORMAL

## 2024-10-15 ENCOUNTER — APPOINTMENT (OUTPATIENT)
Dept: RHEUMATOLOGY | Facility: CLINIC | Age: 39
End: 2024-10-15
Payer: COMMERCIAL

## 2024-10-15 VITALS
BODY MASS INDEX: 22.76 KG/M2 | WEIGHT: 145 LBS | HEIGHT: 67 IN | HEART RATE: 76 BPM | DIASTOLIC BLOOD PRESSURE: 62 MMHG | SYSTOLIC BLOOD PRESSURE: 125 MMHG

## 2024-10-15 DIAGNOSIS — R76.8 POSITIVE ANA (ANTINUCLEAR ANTIBODY): ICD-10-CM

## 2024-10-15 DIAGNOSIS — M25.50 POLYARTHRALGIA: ICD-10-CM

## 2024-10-15 DIAGNOSIS — I73.00 RAYNAUD'S DISEASE WITHOUT GANGRENE: Primary | ICD-10-CM

## 2024-10-15 PROCEDURE — 3008F BODY MASS INDEX DOCD: CPT | Performed by: INTERNAL MEDICINE

## 2024-10-15 PROCEDURE — 1036F TOBACCO NON-USER: CPT | Performed by: INTERNAL MEDICINE

## 2024-10-15 PROCEDURE — 99204 OFFICE O/P NEW MOD 45 MIN: CPT | Performed by: INTERNAL MEDICINE

## 2024-10-15 NOTE — PATIENT INSTRUCTIONS
When an attack occurs, please take a picture of the joint that hurts, do the labs that are ordered    And make an appointment for evaluation    Follow up x 6-8 weeks

## 2024-10-15 NOTE — PROGRESS NOTES
Subjective   Patient ID: [id number]   Fiordaliza Julio is a 39 y.o. female who presents for Joint Pain and Abnormal Lab.  HPI    Has been referral for migratory polyarthralgia  Started 2 months ago  With pain in the right hip on the right lateral aspect  Could not lay on the right side, felt it was swollen  Could not put weight on that side, and was limping   Took ibuprofen 800 mg 2-3 times a day that helped more than 50 %  The episode lasted 4-5 days  , then had pain in the left knee, symptomatic for  a week  Then it went jamar the left shoulder and subsequently her right shoulder, would feel stiff and achy  Unable to do overhead movements   Also had an episode where her wrist was red and swollen for 2-3 days  Went to PCP on 9/12 with these complaints  At that time labs did not show evidence of chronic inflammation  NIRANJAN was 1:80 with positive EBV IgM  No fatigue, fevers, rash, lymph node enlargement, transaminits or cytopenias were detected then  Last episode happened about a week ago  Denies having fevers, flu like illnesses, rash, alopecia, oral or genital ulcers  Denies B symptoms  Mentions she has been RP like episodes since high school  Never had digital ischemias or ulcers  Denies GERD/ dysphagia, sicca symptoms  Denies VTE in the past  No pregnancy related issues.  No autoimmune disease in the first degree relatives    NP by practitioner. Otherwise healthy  No chronic illness, not on any meds  Did not use prednisone for these flares    ROS  Constitutional: Denies fever, chills, weight loss, night sweats or headaches  Eyes: Denies dry eyes, blurry vision, redness or pain or photophobia  ENT: Denies dry mouth, dental loss, loss of taste, nasal or oral ulcers, jaw claudication, difficulty swallowing, nasal crusting or recurrent sinus infections   Cardiovascular: Denies chest pain, palpitations, orthopnea  Respiratory: Denies shortness of breath, cough, asthma, or recurrent respiratory infections  Gastrointestinal:  Denies dysphagia, nausea, vomiting, heartburn, abdominal pain, constipation, diarrhea, melena or hematochezia  Genitourinary: No recurrent urinary infections or STDs, no genital or anal ulcers.  Integumentary: Denies photosensitivity, rash or lesions, Raynaud's phenomenon, skin tightening, digital ulcers, psoriatic lesions, or alopecia  Neurological: Denies any numbness or tingling, muscle weakness, or incontinence   Hematologic/Lymphatic: Denies bleeding, bruising, history of clots (arterial or venous), or abortions/miscarriages/pregnancy complications   MSK: No joint pains, redness, hotness or swelling. No inflammatory back pain, enthesitis, dactylitis. No morning stiffness   Muscular: Denies weakness, difficulty rising from chair or combing the hair, muscle aches, or problems with hand    FHx: No family history of autoimmune diseases       Patient Active Problem List   Diagnosis    Vitamin D deficiency    Female infertility, secondary    Fatigue    Allergic rhinitis        Past Medical History:   Diagnosis Date    Personal history of other diseases of the respiratory system     History of asthma        Past Surgical History:   Procedure Laterality Date    OTHER SURGICAL HISTORY  02/13/2020    [place] tooth extraction    OTHER SURGICAL HISTORY  02/13/2020    Tonsillectomy with adenoidectomy        Social History     Socioeconomic History    Marital status:      Spouse name: Not on file    Number of children: Not on file    Years of education: Not on file    Highest education level: Not on file   Occupational History    Not on file   Tobacco Use    Smoking status: Never    Smokeless tobacco: Never   Substance and Sexual Activity    Alcohol use: Not on file    Drug use: Not on file    Sexual activity: Not on file   Other Topics Concern    Not on file   Social History Narrative    Not on file     Social Determinants of Health     Financial Resource Strain: Not on file   Food Insecurity: Not on file    Transportation Needs: Not on file   Physical Activity: Not on file   Stress: Not on file   Social Connections: Not on file   Intimate Partner Violence: Not on file   Housing Stability: Not on file        No Known Allergies       Current Outpatient Medications:     fluticasone (Flonase) 50 mcg/actuation nasal spray, Administer into affected nostril(s)., Disp: , Rfl:        Objective     Visit Vitals  /62   Pulse 76        Physical Exam    Nail capilloroscopy - normal     General: AAOx3, Cooperative  Head: normocephalic, atraumatic  Eyes: EOMI, conjunctiva clear, sclera white, anicteric  Ears: no redness, swelling, tenderness  Nose: no deformity, no crusting   Throat/Mouth: No oral deformities, no cheek swelling, mucosa appear moist, no oral ulcers noted or loss of dentition   Neck/Lymph: FROM, trachea midline  Lungs: chest expansion symmetric. No respiratory distress.   Heart: RRR  Neuro: CN II-XII grossly intact, no focal deficit  Skin: No rashes, ulcers or photosensitive areas  MSK: Upper Extremities:  Hand/Fingers: No erythema, swelling, tenderness or warmth at DIP, PIP, or MCP joints, FROM grossly. Good hand . No nodules. No deformities   Wrists: No erythema, swelling, warmth or tenderness at wrist, FROM grossly  Elbows: No tenderness, swelling, erythema or warmth at elbows, FROM grossly. No nodules   Shoulders: No swelling, erythema, tenderness or warmth at shoulders. FROM  Lower Extremities:   Hips: No obvious deformities. No joint tenderness, normal ROM grossly. Log roll test negative bilaterally. Susan test is negative bilaterally. No trochanteric bursae TTP  Knees: No tenderness, deformities, swelling, rashes, or warmth, normal ROM grossly. No crepitus, no pes anserine bursa TTP   Ankles: No deformities, tenderness, edema, erythema, ulceration, or warmth at the ankle  Feet: Negative MTP squeeze. Normal ROM grossly.   Spine: No spinal tenderness to palpation. No SI joint tenderness. Gaenslen test  "negative       [unfilled]      Lab Results   Component Value Date    WBC 4.6 09/13/2024    HGB 12.5 09/13/2024    HCT 39.5 09/13/2024    MCV 91 09/13/2024     09/13/2024        Chemistry    Lab Results   Component Value Date/Time     09/13/2024 0830    K 4.9 09/13/2024 0830     09/13/2024 0830    CO2 26 09/13/2024 0830    BUN 9 09/13/2024 0830    CREATININE 0.74 09/13/2024 0830    Lab Results   Component Value Date/Time    CALCIUM 9.4 09/13/2024 0830    ALKPHOS 27 (L) 09/13/2024 0830    AST 14 09/13/2024 0830    ALT 12 09/13/2024 0830    BILITOT 0.6 09/13/2024 0830           Lab Results   Component Value Date    CRP 0.12 09/13/2024      Lab Results   Component Value Date    NIRANJAN Positive (A) 09/13/2024    RF <10 09/13/2024    SEDRATE 4 09/13/2024      No results found for: \"CKTOTAL\"  Lab Results   Component Value Date    NEUTROABS 2.52 09/13/2024      No results found for: \"FERRITIN\"   Lab Results   Component Value Date    HEPCAB NONREACTIVE 11/23/2022      Lab Results   Component Value Date    ALT 12 09/13/2024    AST 14 09/13/2024    ALKPHOS 27 (L) 09/13/2024    BILITOT 0.6 09/13/2024      No results found for: \"PPD\"   Lab Results   Component Value Date    URICACID 3.5 09/13/2024      Lab Results   Component Value Date    CALCIUM 9.4 09/13/2024      No results found for: \"SPEP\", \"UPEP\"   No results found for: \"ALBUR\", \"[id number]\"   .last          US pelvis transvaginal  Indication  ========     Fertility Testing     Impression  =========     Dominant follicle left ovary. Arcuate uterus. Trilaminar appearance   of the endometrium. Unremarkable adenxae.     Uterus  ======     Uterus:  Visualized  Uterus position:  anteverted  Description of uterine malformations:  arcuate uterus  Myometrium:  normal  Endometrium:  trilaminar  Cervix details:  normal  Uterus length  83.3 mm  Uterus width  47.2 mm  Uterus height  39.0 mm  Endometrial thickness, total  5.3 mm     Right Ovary  =========     Rt " ovary:  Visualized  Rt ovary morphology:  normal  Rt ovary D1  19.3 mm  Rt ovary D2  34.4 mm  Rt ovary D3  20.9 mm  Rt ovary Vol  7.3 cm?  Rt ovarian follicle(s):  Follicles identified  Rt ovarian follicles other findings:  Antral Follicles 10<10mm     Left Ovary  ========     Lt ovary:  Visualized  Lt ovary morphology:  normal  Lt ovary D1  23.3 mm  Lt ovary D2  28.7 mm  Lt ovary D3  21.7 mm  Lt ovary Vol  7.6 cm?  Lt ovarian follicle(s):  Follicles identified  Lt ovarian follicle D1  16.1 mm  Lt ovarian follicle D2  17.7 mm  Lt ovarian follicle mean  16.9 mm  Lt ovarian follicle vol  2.414 cm?  Lt ovarian follicles other findings:  Antral Follicles 10+ < 10mm     Cul de Sac  =========     Visualized. Free fluid visualized  Largest pool 13.8 mm x 18.6 mm x 6.9 mm. Vol 0.927 ml     Method  ======     Transabdominal and transvaginal ultrasound examination. View:   Sufficient     === 12/02/22 ===    CHEST 2 VIEW    - Impression -  No evidence of acute intrathoracic abnormality.             Assessment/Plan   Diagnoses and all orders for this visit:    Positive NIRANJAN (antinuclear antibody)  Polyarthralgia  S/s suggestive of palindromic rheumatism  She is seronegative and some of the blood work done was at the time when patient was symptomatic   Which is normal  EBV igM is positive, but otherwise no s/s of infectious mononucleosis  Possibly the migratory arthralgias were of viral nature   Cannot entirely rule out palindromic rheumatism as  manifestation of seronegative RA at this time  Patient is asymptomatic now, advised to continue monitoring her symptoms  In future if any joitn swellings, redness or pain , patient was advised to take a picture of the joint swelling, do the stand by labs that are ordered and make an urgent appointment to be evaluated during an attack.    NIRANJAN is barely positive with negative CATHI, is likely a false positive  But will check complements as well to rule out remote possibility of incipient  lupus or UCTD  Currently apart from arthralgias no other CTD symptoms    -     Referral to Rheumatology  -     C-Reactive Protein; Future  -     CBC and Auto Differential; Future  -     Comprehensive Metabolic Panel; Future  -     C3 Complement; Future  -     C4 Complement; Future  -     Urinalysis with Reflex Microscopic; Future  -     Creatinine, Urine Random; Future  -     Protein, Urine Random; Future  -     Sedimentation Rate; Future  -     Follow Up In Rheumatology; Future    Raynaud's disease without gangrene   RP is of primary nature, does not have secondary changes on NF capilloroscopy and RP has been present for many years      Follow up in 6-8 weeks to see interval development of any symptoms or further episodes of arthritis    MD Michael Leon MD   of Medicine  [facility] - Department of Rheumatology  [facility]   Plan, including risks and benefits, was discussed with the patient, informed on how to reach us.     To schedule an appointment, call  [phone] Jacob or call [phone] for [facility] w

## 2024-10-24 ENCOUNTER — LAB (OUTPATIENT)
Dept: LAB | Facility: LAB | Age: 39
End: 2024-10-24
Payer: COMMERCIAL

## 2024-10-24 DIAGNOSIS — R76.8 POSITIVE ANA (ANTINUCLEAR ANTIBODY): ICD-10-CM

## 2024-10-24 LAB
ALBUMIN SERPL BCP-MCNC: 4.4 G/DL (ref 3.4–5)
ALP SERPL-CCNC: 31 U/L (ref 33–110)
ALT SERPL W P-5'-P-CCNC: 13 U/L (ref 7–45)
ANION GAP SERPL CALC-SCNC: 10 MMOL/L (ref 10–20)
APPEARANCE UR: CLEAR
AST SERPL W P-5'-P-CCNC: 15 U/L (ref 9–39)
BACTERIA #/AREA URNS AUTO: ABNORMAL /HPF
BASOPHILS # BLD AUTO: 0.04 X10*3/UL (ref 0–0.1)
BASOPHILS NFR BLD AUTO: 0.7 %
BILIRUB SERPL-MCNC: 0.4 MG/DL (ref 0–1.2)
BILIRUB UR STRIP.AUTO-MCNC: NEGATIVE MG/DL
BUN SERPL-MCNC: 11 MG/DL (ref 6–23)
C3 SERPL-MCNC: 107 MG/DL (ref 87–200)
C4 SERPL-MCNC: 25 MG/DL (ref 10–50)
CALCIUM SERPL-MCNC: 9 MG/DL (ref 8.6–10.3)
CHLORIDE SERPL-SCNC: 106 MMOL/L (ref 98–107)
CO2 SERPL-SCNC: 29 MMOL/L (ref 21–32)
COLOR UR: YELLOW
CREAT SERPL-MCNC: 0.8 MG/DL (ref 0.5–1.05)
CREAT UR-MCNC: 214.8 MG/DL (ref 20–320)
CRP SERPL-MCNC: 0.1 MG/DL
EGFRCR SERPLBLD CKD-EPI 2021: >90 ML/MIN/1.73M*2
EOSINOPHIL # BLD AUTO: 0.21 X10*3/UL (ref 0–0.7)
EOSINOPHIL NFR BLD AUTO: 3.8 %
ERYTHROCYTE [DISTWIDTH] IN BLOOD BY AUTOMATED COUNT: 12.6 % (ref 11.5–14.5)
ERYTHROCYTE [SEDIMENTATION RATE] IN BLOOD BY WESTERGREN METHOD: 4 MM/H (ref 0–20)
GLUCOSE SERPL-MCNC: 94 MG/DL (ref 74–99)
GLUCOSE UR STRIP.AUTO-MCNC: NORMAL MG/DL
HCT VFR BLD AUTO: 40.2 % (ref 36–46)
HGB BLD-MCNC: 12.7 G/DL (ref 12–16)
HYALINE CASTS #/AREA URNS AUTO: ABNORMAL /LPF
IMM GRANULOCYTES # BLD AUTO: 0.02 X10*3/UL (ref 0–0.7)
IMM GRANULOCYTES NFR BLD AUTO: 0.4 % (ref 0–0.9)
KETONES UR STRIP.AUTO-MCNC: NEGATIVE MG/DL
LEUKOCYTE ESTERASE UR QL STRIP.AUTO: ABNORMAL
LYMPHOCYTES # BLD AUTO: 1.73 X10*3/UL (ref 1.2–4.8)
LYMPHOCYTES NFR BLD AUTO: 31.4 %
MCH RBC QN AUTO: 29 PG (ref 26–34)
MCHC RBC AUTO-ENTMCNC: 31.6 G/DL (ref 32–36)
MCV RBC AUTO: 92 FL (ref 80–100)
MONOCYTES # BLD AUTO: 0.47 X10*3/UL (ref 0.1–1)
MONOCYTES NFR BLD AUTO: 8.5 %
MUCOUS THREADS #/AREA URNS AUTO: ABNORMAL /LPF
NEUTROPHILS # BLD AUTO: 3.04 X10*3/UL (ref 1.2–7.7)
NEUTROPHILS NFR BLD AUTO: 55.2 %
NITRITE UR QL STRIP.AUTO: NEGATIVE
NRBC BLD-RTO: 0 /100 WBCS (ref 0–0)
PH UR STRIP.AUTO: 5.5 [PH]
PLATELET # BLD AUTO: 249 X10*3/UL (ref 150–450)
POTASSIUM SERPL-SCNC: 4.3 MMOL/L (ref 3.5–5.3)
PROT SERPL-MCNC: 6.7 G/DL (ref 6.4–8.2)
PROT UR STRIP.AUTO-MCNC: NEGATIVE MG/DL
PROT UR-ACNC: 16 MG/DL (ref 5–24)
PROT/CREAT UR: 0.07 MG/MG CREAT (ref 0–0.17)
RBC # BLD AUTO: 4.38 X10*6/UL (ref 4–5.2)
RBC # UR STRIP.AUTO: ABNORMAL /UL
RBC #/AREA URNS AUTO: ABNORMAL /HPF
SODIUM SERPL-SCNC: 141 MMOL/L (ref 136–145)
SP GR UR STRIP.AUTO: 1.03
SQUAMOUS #/AREA URNS AUTO: ABNORMAL /HPF
UROBILINOGEN UR STRIP.AUTO-MCNC: NORMAL MG/DL
WBC # BLD AUTO: 5.5 X10*3/UL (ref 4.4–11.3)
WBC #/AREA URNS AUTO: ABNORMAL /HPF

## 2024-10-24 PROCEDURE — 81001 URINALYSIS AUTO W/SCOPE: CPT

## 2024-10-24 PROCEDURE — 85025 COMPLETE CBC W/AUTO DIFF WBC: CPT

## 2024-10-24 PROCEDURE — 84156 ASSAY OF PROTEIN URINE: CPT

## 2024-10-24 PROCEDURE — 86140 C-REACTIVE PROTEIN: CPT

## 2024-10-24 PROCEDURE — 36415 COLL VENOUS BLD VENIPUNCTURE: CPT

## 2024-10-24 PROCEDURE — 80053 COMPREHEN METABOLIC PANEL: CPT

## 2024-10-24 PROCEDURE — 86160 COMPLEMENT ANTIGEN: CPT

## 2024-10-24 PROCEDURE — 82570 ASSAY OF URINE CREATININE: CPT

## 2024-10-24 PROCEDURE — 85652 RBC SED RATE AUTOMATED: CPT

## 2024-10-25 ENCOUNTER — OFFICE VISIT (OUTPATIENT)
Dept: RHEUMATOLOGY | Facility: CLINIC | Age: 39
End: 2024-10-25
Payer: COMMERCIAL

## 2024-10-25 VITALS
TEMPERATURE: 98.1 F | DIASTOLIC BLOOD PRESSURE: 83 MMHG | SYSTOLIC BLOOD PRESSURE: 132 MMHG | WEIGHT: 148 LBS | BODY MASS INDEX: 23.23 KG/M2 | HEART RATE: 83 BPM | HEIGHT: 67 IN

## 2024-10-25 DIAGNOSIS — M35.9 UNDIFFERENTIATED CONNECTIVE TISSUE DISEASE (MULTI): Primary | ICD-10-CM

## 2024-10-25 PROCEDURE — 99214 OFFICE O/P EST MOD 30 MIN: CPT | Performed by: INTERNAL MEDICINE

## 2024-10-25 PROCEDURE — 3008F BODY MASS INDEX DOCD: CPT | Performed by: INTERNAL MEDICINE

## 2024-10-25 PROCEDURE — 1036F TOBACCO NON-USER: CPT | Performed by: INTERNAL MEDICINE

## 2024-10-25 RX ORDER — HYDROXYCHLOROQUINE SULFATE 200 MG/1
200 TABLET, FILM COATED ORAL DAILY
Qty: 30 TABLET | Refills: 5 | Status: SHIPPED | OUTPATIENT
Start: 2024-10-25 | End: 2025-04-23

## 2024-10-25 RX ORDER — PREDNISONE 5 MG/1
TABLET ORAL
Qty: 35 TABLET | Refills: 0 | Status: SHIPPED | OUTPATIENT
Start: 2024-10-25 | End: 2024-11-08

## 2024-10-25 NOTE — PATIENT INSTRUCTIONS
I am prescribing hydroxychloroquine 200 mg daily    If you develop any rashes please let me know, please get a baseline eye exam    Also prescribing prednisone for this arthritis flare- see instructions on the presrcription    Follow up x 3 months

## 2024-10-25 NOTE — PROGRESS NOTES
Subjective   Patient ID: [id number]   Fiordaliza Julio is a 39 y.o. female who presents for Joint Pain.  HPI    Diagnosis - NIRANJAN +ve ( 1:80)    Inflammatory arthritis    RECALL    Has been referral for migratory polyarthralgia  Started 2 months ago  With pain in the right hip on the right lateral aspect  Could not lay on the right side, felt it was swollen  Could not put weight on that side, and was limping   Took ibuprofen 800 mg 2-3 times a day that helped more than 50 %  The episode lasted 4-5 days  , then had pain in the left knee, symptomatic for  a week  Then it went jamar the left shoulder and subsequently her right shoulder, would feel stiff and achy  Unable to do overhead movements   Also had an episode where her wrist was red and swollen for 2-3 days  Went to PCP on 9/12 with these complaints  At that time labs did not show evidence of chronic inflammation  NIRANJAN was 1:80 with positive EBV IgM  No fatigue, fevers, rash, lymph node enlargement, transaminits or cytopenias were detected then  Last episode happened about a week ago  Denies having fevers, flu like illnesses, rash, alopecia, oral or genital ulcers  Denies B symptoms  Mentions she has been RP like episodes since high school  Never had digital ischemias or ulcers  Denies GERD/ dysphagia, sicca symptoms  Denies VTE in the past  No pregnancy related issues.  No autoimmune disease in the first degree relatives    NP by practitioner. Otherwise healthy  No chronic illness, not on any meds  Did not use prednisone for these flares      Interval history  Was fine after last visit   Till yesterday where she had sudden pain and swelling of the right wrist  Hurts to bend, patient sent me a picture and was advised to make an urgent appointment  Also had some achiness in the right shoulder but awas able to move her arm and mild achiness in the left knee no warmth, redness or swelling  Started ibuprofen yesterday feels a bit better    ROS  Other 10 system review is  negative        Patient Active Problem List   Diagnosis    Vitamin D deficiency    Female infertility, secondary    Fatigue    Allergic rhinitis    Polyarthralgia    Raynaud's disease without gangrene    Positive NIRANJAN (antinuclear antibody)        Past Medical History:   Diagnosis Date    Personal history of other diseases of the respiratory system     History of asthma        Past Surgical History:   Procedure Laterality Date    OTHER SURGICAL HISTORY  02/13/2020    [place] tooth extraction    OTHER SURGICAL HISTORY  02/13/2020    Tonsillectomy with adenoidectomy          No Known Allergies       Current Outpatient Medications:     fluticasone (Flonase) 50 mcg/actuation nasal spray, Administer into affected nostril(s)., Disp: , Rfl:     hydroxychloroquine (Plaquenil) 200 mg tablet, Take 1 tablet (200 mg) by mouth once daily., Disp: 30 tablet, Rfl: 5    predniSONE (Deltasone) 5 mg tablet, Take 3 tablets (15 mg) by mouth once daily for 7 days, THEN 2 tablets (10 mg) once daily for 7 days., Disp: 35 tablet, Rfl: 0       Objective     Visit Vitals  /83   Pulse 83   Temp 36.7 °C (98.1 °F)        Physical Exam    Warm right wrist with tenderness over the dorsal aspect    US shows grade 1 synovitis with doppler activity in the radiocarpal joint  Extensor tendons looked normal without tenosynovitis    Rest of exam normal as outlined below    General: AAOx3, Cooperative  Head: normocephalic, atraumatic  Eyes: EOMI, conjunctiva clear, sclera white, anicteric  Ears: no redness, swelling, tenderness  Nose: no deformity, no crusting   Throat/Mouth: No oral deformities, no cheek swelling, mucosa appear moist, no oral ulcers noted or loss of dentition   Neck/Lymph: FROM, trachea midline  Lungs: chest expansion symmetric. No respiratory distress.   Heart: RRR  Neuro: CN II-XII grossly intact, no focal deficit  Skin: No rashes, ulcers or photosensitive areas  MSK: Upper Extremities:  Hand/Fingers: No erythema, swelling,  "tenderness or warmth at DIP, PIP, or MCP joints, FROM grossly. Good hand . No nodules. No deformities   Wrists: No erythema, swelling, warmth or tenderness at wrist, FROM grossly  Elbows: No tenderness, swelling, erythema or warmth at elbows, FROM grossly. No nodules   Shoulders: No swelling, erythema, tenderness or warmth at shoulders. FROM  Lower Extremities:   Hips: No obvious deformities. No joint tenderness, normal ROM grossly. Log roll test negative bilaterally. Susan test is negative bilaterally. No trochanteric bursae TTP  Knees: No tenderness, deformities, swelling, rashes, or warmth, normal ROM grossly. No crepitus, no pes anserine bursa TTP   Ankles: No deformities, tenderness, edema, erythema, ulceration, or warmth at the ankle  Feet: Negative MTP squeeze. Normal ROM grossly.   Spine: No spinal tenderness to palpation. No SI joint tenderness. Gaenslen test negative       [unfilled]      Lab Results   Component Value Date    WBC 5.5 10/24/2024    HGB 12.7 10/24/2024    HCT 40.2 10/24/2024    MCV 92 10/24/2024     10/24/2024        Chemistry    Lab Results   Component Value Date/Time     10/24/2024 0821    K 4.3 10/24/2024 0821     10/24/2024 0821    CO2 29 10/24/2024 0821    BUN 11 10/24/2024 0821    CREATININE 0.80 10/24/2024 0821    Lab Results   Component Value Date/Time    CALCIUM 9.0 10/24/2024 0821    ALKPHOS 31 (L) 10/24/2024 0821    AST 15 10/24/2024 0821    ALT 13 10/24/2024 0821    BILITOT 0.4 10/24/2024 0821           Lab Results   Component Value Date    CRP 0.10 10/24/2024      Lab Results   Component Value Date    NIRANJAN Positive (A) 09/13/2024    RF <10 09/13/2024    SEDRATE 4 10/24/2024      No results found for: \"CKTOTAL\"  Lab Results   Component Value Date    NEUTROABS 3.04 10/24/2024      No results found for: \"FERRITIN\"   Lab Results   Component Value Date    HEPCAB NONREACTIVE 11/23/2022      Lab Results   Component Value Date    ALT 13 10/24/2024    AST 15 " "10/24/2024    ALKPHOS 31 (L) 10/24/2024    BILITOT 0.4 10/24/2024      No results found for: \"PPD\"   Lab Results   Component Value Date    URICACID 3.5 09/13/2024      Lab Results   Component Value Date    CALCIUM 9.0 10/24/2024      No results found for: \"SPEP\", \"UPEP\"   No results found for: \"ALBUR\", \"[id number]\"   .last          US pelvis transvaginal  Indication  ========     Fertility Testing     Impression  =========     Dominant follicle left ovary. Arcuate uterus. Trilaminar appearance   of the endometrium. Unremarkable adenxae.     Uterus  ======     Uterus:  Visualized  Uterus position:  anteverted  Description of uterine malformations:  arcuate uterus  Myometrium:  normal  Endometrium:  trilaminar  Cervix details:  normal  Uterus length  83.3 mm  Uterus width  47.2 mm  Uterus height  39.0 mm  Endometrial thickness, total  5.3 mm     Right Ovary  =========     Rt ovary:  Visualized  Rt ovary morphology:  normal  Rt ovary D1  19.3 mm  Rt ovary D2  34.4 mm  Rt ovary D3  20.9 mm  Rt ovary Vol  7.3 cm?  Rt ovarian follicle(s):  Follicles identified  Rt ovarian follicles other findings:  Antral Follicles 10<10mm     Left Ovary  ========     Lt ovary:  Visualized  Lt ovary morphology:  normal  Lt ovary D1  23.3 mm  Lt ovary D2  28.7 mm  Lt ovary D3  21.7 mm  Lt ovary Vol  7.6 cm?  Lt ovarian follicle(s):  Follicles identified  Lt ovarian follicle D1  16.1 mm  Lt ovarian follicle D2  17.7 mm  Lt ovarian follicle mean  16.9 mm  Lt ovarian follicle vol  2.414 cm?  Lt ovarian follicles other findings:  Antral Follicles 10+ < 10mm     Cul de Sac  =========     Visualized. Free fluid visualized  Largest pool 13.8 mm x 18.6 mm x 6.9 mm. Vol 0.927 ml     Method  ======     Transabdominal and transvaginal ultrasound examination. View:   Sufficient     === 12/02/22 ===    CHEST 2 VIEW    - Impression -  No evidence of acute intrathoracic abnormality.             Assessment/Plan   Diagnoses and all orders for this " visit:    Positive NIRANJAN (antinuclear antibody)  Polyarthralgia with palindromic pattern of arthritis  NIRANJAN 1:80   Otherwise CATHI, C3,C4, ESR, CRP normal today  During a flare as well ; labs done yesterday during an acute flare  But ultrasound shows evidence of grade 1/2 synovitis + doppler in the radiocarpal joint consistent with an inflammatory arthritis episode  Will proceed with prednisone course for the flare  Patient is being started on HCQ therapy  S/e explained  Advised to get a baseline eye exam    Raynaud's disease without gangrene   RP is of primary nature, does not have secondary changes on NF capilloroscopy and RP has been present for many years    Follow up  3 months    Michael Coyne MD      of Medicine  [facility] - Department of Rheumatology  [facility]   Plan, including risks and benefits, was discussed with the patient, informed on how to reach us.     To schedule an appointment, call  [phone] Jacob or call [phone] for [facility] nel

## 2024-11-25 ENCOUNTER — APPOINTMENT (OUTPATIENT)
Dept: OPHTHALMOLOGY | Facility: CLINIC | Age: 39
End: 2024-11-25
Payer: COMMERCIAL

## 2024-12-03 ENCOUNTER — APPOINTMENT (OUTPATIENT)
Dept: RHEUMATOLOGY | Facility: CLINIC | Age: 39
End: 2024-12-03
Payer: COMMERCIAL

## 2025-01-15 ENCOUNTER — LAB (OUTPATIENT)
Dept: LAB | Facility: LAB | Age: 40
End: 2025-01-15
Payer: COMMERCIAL

## 2025-01-15 DIAGNOSIS — M35.9 UNDIFFERENTIATED CONNECTIVE TISSUE DISEASE (MULTI): ICD-10-CM

## 2025-01-15 PROCEDURE — 84207 ASSAY OF VITAMIN B-6: CPT

## 2025-01-19 LAB — PYRIDOXAL PHOS SERPL-SCNC: 32 NMOL/L (ref 20–125)

## 2025-01-21 ENCOUNTER — APPOINTMENT (OUTPATIENT)
Dept: RHEUMATOLOGY | Facility: CLINIC | Age: 40
End: 2025-01-21
Payer: COMMERCIAL

## 2025-01-28 ENCOUNTER — OFFICE VISIT (OUTPATIENT)
Dept: PRIMARY CARE | Facility: CLINIC | Age: 40
End: 2025-01-28
Payer: COMMERCIAL

## 2025-01-28 VITALS
OXYGEN SATURATION: 99 % | WEIGHT: 150 LBS | DIASTOLIC BLOOD PRESSURE: 82 MMHG | TEMPERATURE: 97.6 F | BODY MASS INDEX: 23.49 KG/M2 | HEART RATE: 90 BPM | SYSTOLIC BLOOD PRESSURE: 120 MMHG | RESPIRATION RATE: 20 BRPM

## 2025-01-28 DIAGNOSIS — J02.9 SORE THROAT: ICD-10-CM

## 2025-01-28 DIAGNOSIS — J02.9 ACUTE PHARYNGITIS, UNSPECIFIED ETIOLOGY: Primary | ICD-10-CM

## 2025-01-28 LAB — POC RAPID STREP: NEGATIVE

## 2025-01-28 PROCEDURE — 87880 STREP A ASSAY W/OPTIC: CPT | Performed by: NURSE PRACTITIONER

## 2025-01-28 PROCEDURE — 99213 OFFICE O/P EST LOW 20 MIN: CPT | Performed by: NURSE PRACTITIONER

## 2025-01-28 PROCEDURE — 1036F TOBACCO NON-USER: CPT | Performed by: NURSE PRACTITIONER

## 2025-01-28 RX ORDER — AMOXICILLIN AND CLAVULANATE POTASSIUM 875; 125 MG/1; MG/1
875 TABLET, FILM COATED ORAL 2 TIMES DAILY
Qty: 14 TABLET | Refills: 0 | Status: SHIPPED | OUTPATIENT
Start: 2025-01-28 | End: 2025-02-04

## 2025-01-28 ASSESSMENT — ENCOUNTER SYMPTOMS
SLEEP DISTURBANCE: 0
VOMITING: 0
CONSTIPATION: 0
CHILLS: 0
HEADACHES: 1
COUGH: 1
SINUS PRESSURE: 0
HOARSE VOICE: 1
FEVER: 0
TROUBLE SWALLOWING: 1
SWOLLEN GLANDS: 1
NAUSEA: 1
NECK PAIN: 1
APPETITE CHANGE: 0
SINUS PAIN: 0
ACTIVITY CHANGE: 0
DIARRHEA: 0
SORE THROAT: 1

## 2025-01-28 NOTE — PROGRESS NOTES
Subjective   Patient ID: Fiordaliza Julio is a 39 y.o. female who presents for Sore Throat (5 days/).    Cold symptoms x5 days  Sore throat  Cough  Congestion  Headaches  Swollen glands  Hoarse voice  Nausea    OTC- dayquil, nyquil, mouthwash gargle, ibuprofen    Sore Throat   This is a new problem. Episode onset: 5 days. The problem has been gradually worsening. Neither side of throat is experiencing more pain than the other. There has been no fever. The pain is at a severity of 8/10. Associated symptoms include congestion, coughing, headaches, a hoarse voice, neck pain, swollen glands and trouble swallowing. Pertinent negatives include no diarrhea, ear pain or vomiting. Associated symptoms comments: Nausea  Not able to eat for last 2 days  Forces herself to drink  . She has tried NSAIDs and gargles (dayquil, nyquil, mouthwash) for the symptoms. The treatment provided no relief.        Review of Systems   Constitutional:  Negative for activity change, appetite change, chills and fever.   HENT:  Positive for congestion, hoarse voice, sore throat and trouble swallowing. Negative for ear pain, sinus pressure and sinus pain.    Respiratory:  Positive for cough.    Gastrointestinal:  Positive for nausea. Negative for constipation, diarrhea and vomiting.   Musculoskeletal:  Positive for neck pain.   Neurological:  Positive for headaches.   Psychiatric/Behavioral:  Negative for sleep disturbance.        Objective   /82   Pulse 90   Temp 36.4 °C (97.6 °F) (Temporal)   Resp 20   Wt 68 kg (150 lb)   SpO2 99%   BMI 23.49 kg/m²     Physical Exam  Vitals reviewed.   Constitutional:       General: She is not in acute distress.     Appearance: Normal appearance. She is not ill-appearing or toxic-appearing.   HENT:      Head: Normocephalic.      Right Ear: Tympanic membrane, ear canal and external ear normal.      Left Ear: Tympanic membrane, ear canal and external ear normal.      Nose: Mucosal edema, congestion and  rhinorrhea present. Rhinorrhea is clear.      Right Turbinates: Swollen.      Left Turbinates: Swollen.      Mouth/Throat:      Lips: Pink.      Mouth: Mucous membranes are moist.      Pharynx: Posterior oropharyngeal erythema and postnasal drip present.   Eyes:      Extraocular Movements: Extraocular movements intact.      Conjunctiva/sclera: Conjunctivae normal.      Pupils: Pupils are equal, round, and reactive to light.   Cardiovascular:      Rate and Rhythm: Normal rate and regular rhythm.      Pulses: Normal pulses.      Heart sounds: Normal heart sounds.   Pulmonary:      Effort: Pulmonary effort is normal.      Breath sounds: Normal breath sounds.   Musculoskeletal:      Cervical back: Normal range of motion and neck supple.   Lymphadenopathy:      Cervical: Cervical adenopathy present.   Skin:     General: Skin is warm and dry.      Capillary Refill: Capillary refill takes less than 2 seconds.   Neurological:      General: No focal deficit present.      Mental Status: She is alert and oriented to person, place, and time.   Psychiatric:         Mood and Affect: Mood normal.         Behavior: Behavior normal.         Assessment/Plan   Diagnoses and all orders for this visit:  Acute pharyngitis, unspecified etiology  -     amoxicillin-pot clavulanate (Augmentin) 875-125 mg tablet; Take 1 tablet (875 mg) by mouth 2 times a day for 7 days.  Sore throat  -     POCT rapid strep A manually resulted  -     Group A Streptococcus, PCR    IO Strep negative; PCR swab to be sent. Will follow up on results as needed  Antibiotic sent in for acute pharyngitis. Take full course until completed  Encouraged to continue with Flonase and Zyrtec daily   Can use Tylenol and Motrin as needed for fever or pain  Follow up with PCP if not improving over the next 2-3 days  ER for any SOB, difficulty breathing, uncontrolled fevers or worsening of symptoms

## 2025-01-29 LAB — S PYO DNA THROAT QL NAA+PROBE: DETECTED

## 2025-01-29 RX ORDER — AMOXICILLIN AND CLAVULANATE POTASSIUM 875; 125 MG/1; MG/1
875 TABLET, FILM COATED ORAL 2 TIMES DAILY
Qty: 6 TABLET | Refills: 0 | Status: SHIPPED | OUTPATIENT
Start: 2025-01-29 | End: 2025-02-01

## 2025-03-18 ENCOUNTER — APPOINTMENT (OUTPATIENT)
Dept: RHEUMATOLOGY | Facility: CLINIC | Age: 40
End: 2025-03-18
Payer: COMMERCIAL

## 2025-03-18 VITALS
WEIGHT: 147 LBS | HEIGHT: 67 IN | BODY MASS INDEX: 23.07 KG/M2 | SYSTOLIC BLOOD PRESSURE: 125 MMHG | HEART RATE: 85 BPM | DIASTOLIC BLOOD PRESSURE: 78 MMHG

## 2025-03-18 DIAGNOSIS — R76.8 POSITIVE ANA (ANTINUCLEAR ANTIBODY): ICD-10-CM

## 2025-03-18 DIAGNOSIS — M35.9 UNDIFFERENTIATED CONNECTIVE TISSUE DISEASE (MULTI): ICD-10-CM

## 2025-03-18 DIAGNOSIS — M19.90 INFLAMMATORY ARTHRITIS: Primary | ICD-10-CM

## 2025-03-18 PROCEDURE — 1036F TOBACCO NON-USER: CPT | Performed by: INTERNAL MEDICINE

## 2025-03-18 PROCEDURE — 3008F BODY MASS INDEX DOCD: CPT | Performed by: INTERNAL MEDICINE

## 2025-03-18 PROCEDURE — 99214 OFFICE O/P EST MOD 30 MIN: CPT | Performed by: INTERNAL MEDICINE

## 2025-03-18 RX ORDER — HYDROXYCHLOROQUINE SULFATE 200 MG/1
200 TABLET, FILM COATED ORAL DAILY
Qty: 90 TABLET | Refills: 1 | Status: SHIPPED | OUTPATIENT
Start: 2025-03-18 | End: 2025-09-14

## 2025-03-18 NOTE — PROGRESS NOTES
Subjective   Patient ID: [id number]   Fiordaliza Julio is a 39 y.o. female who presents for Arthritis.  Arthritis      Diagnosis - NIRANJAN +ve ( 1:80)    Undifferentiated Inflammatory arthritis      Interval history 3.18.25  Doing very well on HCQ  No significant MST   No joint swellings  Had a mild flare a week ago but manageable.  No sicca symptoms, photosensitivity, rash, ulcers   Had strep infection in January, recovered unveventfully  No s/e with HCQ    RECALL    Has been referral for migratory polyarthralgia  Started 2 months ago  With pain in the right hip on the right lateral aspect  Could not lay on the right side, felt it was swollen  Could not put weight on that side, and was limping   Took ibuprofen 800 mg 2-3 times a day that helped more than 50 %  The episode lasted 4-5 days  , then had pain in the left knee, symptomatic for  a week  Then it went jamar the left shoulder and subsequently her right shoulder, would feel stiff and achy  Unable to do overhead movements   Also had an episode where her wrist was red and swollen for 2-3 days  Went to PCP on 9/12 with these complaints  At that time labs did not show evidence of chronic inflammation  NIRANJAN was 1:80 with positive EBV IgM  No fatigue, fevers, rash, lymph node enlargement, transaminits or cytopenias were detected then  Last episode happened about a week ago  Denies having fevers, flu like illnesses, rash, alopecia, oral or genital ulcers  Denies B symptoms  Mentions she has been RP like episodes since high school  Never had digital ischemias or ulcers  Denies GERD/ dysphagia, sicca symptoms  Denies VTE in the past  No pregnancy related issues.  No autoimmune disease in the first degree relatives    NP by practitioner. Otherwise healthy  No chronic illness, not on any meds  Did not use prednisone for these flares      Interval history 11/2024  Was fine after last visit   Till yesterday where she had sudden pain and swelling of the right wrist  Hurts to bend,  patient sent me a picture and was advised to make an urgent appointment  Also had some achiness in the right shoulder but awas able to move her arm and mild achiness in the left knee no warmth, redness or swelling  Started ibuprofen yesterday feels a bit better    ROS  Other 10 system review is negative        Patient Active Problem List   Diagnosis    Vitamin D deficiency    Female infertility, secondary    Fatigue    Allergic rhinitis    Polyarthralgia    Raynaud's disease without gangrene    Positive NIRANJAN (antinuclear antibody)        Past Medical History:   Diagnosis Date    Personal history of other diseases of the respiratory system     History of asthma        Past Surgical History:   Procedure Laterality Date    OTHER SURGICAL HISTORY  02/13/2020    [place] tooth extraction    OTHER SURGICAL HISTORY  02/13/2020    Tonsillectomy with adenoidectomy          No Known Allergies       Current Outpatient Medications:     fluticasone (Flonase) 50 mcg/actuation nasal spray, Administer into affected nostril(s)., Disp: , Rfl:     hydroxychloroquine (Plaquenil) 200 mg tablet, Take 1 tablet (200 mg) by mouth once daily., Disp: 30 tablet, Rfl: 5       Objective     Visit Vitals  /78   Pulse 85        Physical Exam    Warm right wrist with tenderness over the dorsal aspect    US shows grade 1 synovitis with doppler activity in the radiocarpal joint  Extensor tendons looked normal without tenosynovitis        Today   Exam normal  No synovitis    Rest of exam normal as outlined below    General: AAOx3, Cooperative  Head: normocephalic, atraumatic  Eyes: EOMI, conjunctiva clear, sclera white, anicteric  Ears: no redness, swelling, tenderness  Nose: no deformity, no crusting   Throat/Mouth: No oral deformities, no cheek swelling, mucosa appear moist, no oral ulcers noted or loss of dentition     Hand/Fingers: No erythema, swelling, tenderness or warmth at DIP, PIP, or MCP joints, FROM grossly. Good hand . No nodules.  "No deformities   Wrists: No erythema, swelling, warmth or tenderness at wrist, FROM grossly  Elbows: No tenderness, swelling, erythema or warmth at elbows, FROM grossly. No nodules   Shoulders: No swelling, erythema, tenderness or warmth at shoulders. FROM  Lower Extremities:   Hips: No obvious deformities. No joint tenderness, normal ROM grossly. Log roll test negative bilaterally. Susan test is negative bilaterally. No trochanteric bursae TTP  Knees: No tenderness, deformities, swelling, rashes, or warmth, normal ROM grossly. No crepitus, no pes anserine bursa TTP   Ankles: No deformities, tenderness, edema, erythema, ulceration, or warmth at the ankle  Feet: Negative MTP squeeze. Normal ROM grossly.   Spine: No spinal tenderness to palpation. No SI joint tenderness. Gaenslen test negative       [unfilled]      Lab Results   Component Value Date    WBC 5.5 10/24/2024    HGB 12.7 10/24/2024    HCT 40.2 10/24/2024    MCV 92 10/24/2024     10/24/2024        Chemistry    Lab Results   Component Value Date/Time     10/24/2024 0821    K 4.3 10/24/2024 0821     10/24/2024 0821    CO2 29 10/24/2024 0821    BUN 11 10/24/2024 0821    CREATININE 0.80 10/24/2024 0821    Lab Results   Component Value Date/Time    CALCIUM 9.0 10/24/2024 0821    ALKPHOS 31 (L) 10/24/2024 0821    AST 15 10/24/2024 0821    ALT 13 10/24/2024 0821    BILITOT 0.4 10/24/2024 0821           Lab Results   Component Value Date    CRP 0.10 10/24/2024      Lab Results   Component Value Date    NIRANJAN Positive (A) 09/13/2024    RF <10 09/13/2024    SEDRATE 4 10/24/2024      No results found for: \"CKTOTAL\"  Lab Results   Component Value Date    NEUTROABS 3.04 10/24/2024      No results found for: \"FERRITIN\"   Lab Results   Component Value Date    HEPCAB NONREACTIVE 11/23/2022      Lab Results   Component Value Date    ALT 13 10/24/2024    AST 15 10/24/2024    ALKPHOS 31 (L) 10/24/2024    BILITOT 0.4 10/24/2024      No results found for: " "\"PPD\"   Lab Results   Component Value Date    URICACID 3.5 09/13/2024      Lab Results   Component Value Date    CALCIUM 9.0 10/24/2024      No results found for: \"SPEP\", \"UPEP\"   No results found for: \"ALBUR\", \"[id number]\"   .last          US pelvis transvaginal  Indication  ========     Fertility Testing     Impression  =========     Dominant follicle left ovary. Arcuate uterus. Trilaminar appearance   of the endometrium. Unremarkable adenxae.     Uterus  ======     Uterus:  Visualized  Uterus position:  anteverted  Description of uterine malformations:  arcuate uterus  Myometrium:  normal  Endometrium:  trilaminar  Cervix details:  normal  Uterus length  83.3 mm  Uterus width  47.2 mm  Uterus height  39.0 mm  Endometrial thickness, total  5.3 mm     Right Ovary  =========     Rt ovary:  Visualized  Rt ovary morphology:  normal  Rt ovary D1  19.3 mm  Rt ovary D2  34.4 mm  Rt ovary D3  20.9 mm  Rt ovary Vol  7.3 cm?  Rt ovarian follicle(s):  Follicles identified  Rt ovarian follicles other findings:  Antral Follicles 10<10mm     Left Ovary  ========     Lt ovary:  Visualized  Lt ovary morphology:  normal  Lt ovary D1  23.3 mm  Lt ovary D2  28.7 mm  Lt ovary D3  21.7 mm  Lt ovary Vol  7.6 cm?  Lt ovarian follicle(s):  Follicles identified  Lt ovarian follicle D1  16.1 mm  Lt ovarian follicle D2  17.7 mm  Lt ovarian follicle mean  16.9 mm  Lt ovarian follicle vol  2.414 cm?  Lt ovarian follicles other findings:  Antral Follicles 10+ < 10mm     Cul de Sac  =========     Visualized. Free fluid visualized  Largest pool 13.8 mm x 18.6 mm x 6.9 mm. Vol 0.927 ml     Method  ======     Transabdominal and transvaginal ultrasound examination. View:   Sufficient     === 12/02/22 ===    CHEST 2 VIEW    - Impression -  No evidence of acute intrathoracic abnormality.             Assessment/Plan   Diagnoses and all orders for this visit:    Positive NIRANJAN (antinuclear antibody)  Undifferentiated inflammatory arthritis  Polyarthralgia " with migratory pattern previously with US evidence of wrist arthritis during a flare  NIRANJAN 1:80   Otherwise CATHI, C3,C4, ESR, CRP   No inflammatory response in the labs during a flare but had US evidence of synovitis in the wrist    But ultrasound shows evidence of grade 1/2 synovitis + doppler in the radiocarpal joint consistent with an inflammatory arthritis episode    On HCQ - < 5 mg /kg  Wants to stay on this dose as she is doing well    Advised to get a baseline eye exam  for HCQ    Raynaud's disease without gangrene   RP is of primary nature, does not have secondary changes on NF capilloroscopy and RP has been present for many years    Follow up  5-6 mo    Michael Coyne MD FACR   of Medicine  [facility] - Department of Rheumatology  [facility]   Plan, including risks and benefits, was discussed with the patient, informed on how to reach us.     To schedule an appointment, call  [phone] [place] or call [phone] for [facility] nel

## 2025-03-18 NOTE — PATIENT INSTRUCTIONS
Continue on HCQ  200 mg daily    If you flare up please let me know     Please do labs today    Follow up x 4-6 months

## 2025-04-30 ENCOUNTER — APPOINTMENT (OUTPATIENT)
Dept: RADIOLOGY | Facility: CLINIC | Age: 40
End: 2025-04-30
Payer: COMMERCIAL

## 2025-04-30 DIAGNOSIS — Z12.31 SCREENING MAMMOGRAM FOR BREAST CANCER: ICD-10-CM

## 2025-05-14 ENCOUNTER — APPOINTMENT (OUTPATIENT)
Dept: RADIOLOGY | Facility: CLINIC | Age: 40
End: 2025-05-14
Payer: COMMERCIAL

## 2025-05-14 VITALS — WEIGHT: 147 LBS | HEIGHT: 67 IN | BODY MASS INDEX: 23.07 KG/M2

## 2025-05-14 DIAGNOSIS — Z12.31 SCREENING MAMMOGRAM FOR BREAST CANCER: ICD-10-CM

## 2025-05-14 PROCEDURE — 77063 BREAST TOMOSYNTHESIS BI: CPT

## 2025-05-14 PROCEDURE — 77063 BREAST TOMOSYNTHESIS BI: CPT | Performed by: RADIOLOGY

## 2025-05-14 PROCEDURE — 77067 SCR MAMMO BI INCL CAD: CPT | Performed by: RADIOLOGY

## 2025-05-16 DIAGNOSIS — R92.8 ABNORMALITY OF LEFT BREAST ON SCREENING MAMMOGRAM: Primary | ICD-10-CM

## 2025-06-10 ENCOUNTER — HOSPITAL ENCOUNTER (OUTPATIENT)
Dept: RADIOLOGY | Facility: HOSPITAL | Age: 40
Discharge: HOME | End: 2025-06-10
Payer: COMMERCIAL

## 2025-06-10 DIAGNOSIS — R92.8 ABNORMALITY OF LEFT BREAST ON SCREENING MAMMOGRAM: ICD-10-CM

## 2025-06-10 PROCEDURE — 77061 BREAST TOMOSYNTHESIS UNI: CPT | Mod: LT

## 2025-06-10 PROCEDURE — 77061 BREAST TOMOSYNTHESIS UNI: CPT | Mod: LEFT SIDE | Performed by: STUDENT IN AN ORGANIZED HEALTH CARE EDUCATION/TRAINING PROGRAM

## 2025-06-10 PROCEDURE — 77065 DX MAMMO INCL CAD UNI: CPT | Mod: LEFT SIDE | Performed by: STUDENT IN AN ORGANIZED HEALTH CARE EDUCATION/TRAINING PROGRAM

## 2025-09-16 ENCOUNTER — APPOINTMENT (OUTPATIENT)
Dept: RHEUMATOLOGY | Facility: CLINIC | Age: 40
End: 2025-09-16
Payer: COMMERCIAL